# Patient Record
Sex: FEMALE | Race: WHITE | Employment: FULL TIME | URBAN - METROPOLITAN AREA
[De-identification: names, ages, dates, MRNs, and addresses within clinical notes are randomized per-mention and may not be internally consistent; named-entity substitution may affect disease eponyms.]

---

## 2019-05-21 ENCOUNTER — OFFICE VISIT (OUTPATIENT)
Dept: URGENT CARE | Facility: RETAIL CLINIC | Age: 69
End: 2019-05-21
Payer: MEDICARE

## 2019-05-21 VITALS — TEMPERATURE: 98.5 F | SYSTOLIC BLOOD PRESSURE: 147 MMHG | HEART RATE: 91 BPM | DIASTOLIC BLOOD PRESSURE: 87 MMHG

## 2019-05-21 DIAGNOSIS — H10.33 ACUTE CONJUNCTIVITIS OF BOTH EYES, UNSPECIFIED ACUTE CONJUNCTIVITIS TYPE: Primary | ICD-10-CM

## 2019-05-21 PROCEDURE — 99213 OFFICE O/P EST LOW 20 MIN: CPT | Performed by: FAMILY MEDICINE

## 2019-05-21 RX ORDER — POLYMYXIN B SULFATE AND TRIMETHOPRIM 1; 10000 MG/ML; [USP'U]/ML
1 SOLUTION OPHTHALMIC EVERY 4 HOURS
Qty: 1 BOTTLE | Refills: 0 | Status: SHIPPED | OUTPATIENT
Start: 2019-05-21 | End: 2019-05-28

## 2019-05-21 RX ORDER — LOSARTAN POTASSIUM 100 MG/1
TABLET ORAL
COMMUNITY
Start: 2015-03-25

## 2019-05-21 NOTE — PROGRESS NOTES
SUBJECTIVE:  Cecilia Fried is a 69 year old female who presents complaining of moderate both eyes redness for 1 day(s).   Onset/timing: rapid.    Associated Signs and Symptoms: none  Treatment measures tried include: flushed with water  and antihistamine drops  Contact wearer : No    No past medical history on file.  Current Outpatient Medications   Medication Sig Dispense Refill     losartan (COZAAR) 100 MG tablet        trimethoprim-polymyxin b (POLYTRIM) 15324-5.1 UNIT/ML-% ophthalmic solution Place 1 drop into both eyes every 4 hours for 7 days 1 Bottle 0     History   Smoking Status     Never Smoker   Smokeless Tobacco     Never Used       ROS:  Review of systems negative except as stated above.    OBJECTIVE:  /87   Pulse 91   Temp 98.5  F (36.9  C) (Oral)   General: no acute distress  Eye exam: right eye abnormal findings: conjunctivitis with erythema, left eye abnormal findings: conjunctivitis with erythema.  Ears: normal canals, TMs bilaterally, normal TM mobility  Nose: NORMAL - no drainage, turbinates normal in size.  Neck: supple, non-tender, free range of motion, no adenopathy  Heart: NORMAL - regular rate and rhythm without murmur.  Lungs: normal and clear to auscultation    ASSESSMENT:  Bacterial Conjunctivitis    PLAN:  Warm packs for comfort. Hygiene measures discussed. Polytrim ophthalmic drops-1-2 drops in the affected eye(s) every 4 hours while awake for 3 days.  Follow up with primary care provider if no improvement.